# Patient Record
Sex: FEMALE | Race: WHITE | Employment: FULL TIME | URBAN - METROPOLITAN AREA
[De-identification: names, ages, dates, MRNs, and addresses within clinical notes are randomized per-mention and may not be internally consistent; named-entity substitution may affect disease eponyms.]

---

## 2021-04-14 ENCOUNTER — OFFICE VISIT (OUTPATIENT)
Dept: VASCULAR SURGERY | Facility: CLINIC | Age: 56
End: 2021-04-14
Payer: COMMERCIAL

## 2021-04-14 DIAGNOSIS — I78.1 TELANGIECTASIAS: Primary | ICD-10-CM

## 2021-04-14 PROCEDURE — 99202 OFFICE O/P NEW SF 15 MIN: CPT | Performed by: SURGERY

## 2021-04-14 RX ORDER — MULTIPLE VITAMINS W/ MINERALS TAB 9MG-400MCG
1 TAB ORAL DAILY
COMMUNITY

## 2021-04-14 RX ORDER — COVID-19 ANTIGEN TEST
220 KIT MISCELLANEOUS 2 TIMES DAILY WITH MEALS
COMMUNITY

## 2021-04-14 RX ORDER — CALCIUM CARBONATE 500(1250)
1 TABLET ORAL 2 TIMES DAILY
COMMUNITY

## 2021-04-14 NOTE — PROGRESS NOTES
After Visit Follow Up  Per Dr. Keen, patient was recommended to have 3 - 4 sessions at $407 of cosmetic sclerotherapy.    Patient in agreement with plan and had no further questions.    Clara Figueroa on 4/14/2021 at 4:27 PM

## 2021-04-14 NOTE — LETTER
2021         RE: Natali Franklin  [address]  [facility] [address]        Dear Colleague,    Thank you for referring your patient, Natali Franklin, to the [facility] [place]. Please see a copy of my visit note below.        [facility] CONSULTATION    HPI:    Natali Franklin is a 56 year old female who presents with complaints of bilateral leg telangiectasias. They are asymptomatic. She has noticed them from the time of her pregnancies, or at least 30 years. She has not noticed itching of her skin. She has occasional restless-leg type sensations. She has not noticed swelling in her ankles, soreness or cramping in the legs. No history of blood clots or prolonged wound healing. No bleeding from the telangiectasias.     I have reviewed and updated the history.   PAST MEDICAL HISTORY: No past medical history on file.   Prior gastric bypass (SHANNAN resolved following bypass)  GERD  Headaches  Reynaud's syndrome  DL--not on medications  Iron-deficiency anemia     Current Outpatient Medications   Medication Sig Dispense Refill     calcium carbonate (OS-SLOANE) 500 MG tablet Take 1 tablet by mouth 2 times daily       cholecalciferol (VITAMIN D3) 25 mcg (1000 units) capsule Take 1 capsule by mouth daily       multivitamin w/minerals (MULTI-VITAMIN) tablet Take 1 tablet by mouth daily       naproxen sodium 220 MG capsule Take 220 mg by mouth 2 times daily (with meals)         PAST SURGICAL HISTORY: No past surgical history on file.   Laparoscopic pablo-en-Y gastric bypass in 2013  R knee arthroscopy with lateral meniscectomy   x 3    ALLERGIES:    Allergies   Allergen Reactions     Nsaids Other (See Comments)     Patient had gastric bypass surgery.  Should not take oral NSAID's ever.       FAMILY HISTORY: No family history on file.  Mother has varicose veins in the family. No known family history of hypercoagulability or hemophilias.     SOCIAL HISTORY:   Social History     Tobacco  Use     Smoking status: Never Smoker     Smokeless tobacco: Never Used   Substance Use Topics     Alcohol use: Not on file     Non-smoker. Drinks 2 cocktails a week. No illicit drug use. 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     REVIEW OF SYSTEMS:  10-pt ROS negative except as noted in HPI.       Vital signs:  There were no vitals taken for this visit.    PHYSICAL EXAM:  General: Sitting comfortably in chair, NAD.   HEENT: EOMI and conjugate, MMM   Respiratory: Normal respiratory effort.   Cardiovascular: Pulse is regular.   Musculoskeletal: Normal walking gait around exam room. Grossly normal and symmetric strength and ROM in BLE. No edema present.  Vascular: dorsalis pedis: palpable bilaterally.  Cap refill < 3 sec over feet/toes.  Veins: No varicosities present. Scattered bilateral upper and lower leg telangiectasias present.  Neurologic: A&Ox4  Psychiatric: Pleasantly conversant                   ASSESSMENT / PLAN:  Natali Franklin is a 56 year old female who presents with complaints of cosmetically displeasing bilateral leg telangiectasias. She does not have any signs or symptoms concerning for other underlying venous disease.        I discussed that the treatment of telangiectasias is primarily cosmetic and is not covered by insurance.     Discussed that they do not represent a concern to her overall health or wellbeing, and are generally not a symptom of underlying pathology.     Explained that the treatment of telangiectasias is via sclerotherapy, or injections of a medication meant to irritate and scar the veins.     I explained the potential risks of matting, hyperpigmentation, superficial skin ulceration, irritation or pain at the injection sites, future recurrence; discussed that typically 3-4 sessions are required, with about 4 to 6 weeks in between each to determine the degree of response and resolution; the appearance is worse the first 24-72 hours following an injection; that  some scar tissue will always be visible, and that we are trying to diminish the appearance rather than eradicating the veins completely    She would like to focus on her lower legs, as these are the sites that are more prominent with her usual wardrobe. Based on the response there, she may proceed to treating the upper legs as well.     Explained that she is welcome to make an appointment for sclerotherapy at any point in the future, and that this procedure would be done in the office.    Vilma Keen MD      After Visit Follow Up  Per Dr. Keen, patient was recommended to have 3 - 4 sessions at $407 of cosmetic sclerotherapy.    Patient in agreement with plan and had no further questions.    Clara Blair Henry on 4/14/2021 at 4:27 PM      Again, thank you for allowing me to participate in the care of your patient.        Sincerely,        Vilma Keen MD

## 2021-04-14 NOTE — PROGRESS NOTES
[facility] CONSULTATION    HPI:    Natali Franklin is a 56 year old female who presents with complaints of bilateral leg telangiectasias. They are asymptomatic. She has noticed them from the time of her pregnancies, or at least 30 years. She has not noticed itching of her skin. She has occasional restless-leg type sensations. She has not noticed swelling in her ankles, soreness or cramping in the legs. No history of blood clots or prolonged wound healing. No bleeding from the telangiectasias.     I have reviewed and updated the history.   PAST MEDICAL HISTORY: No past medical history on file.   Prior gastric bypass (SHANNAN resolved following bypass)  GERD  Headaches  Reynaud's syndrome  DL--not on medications  Iron-deficiency anemia     Current Outpatient Medications   Medication Sig Dispense Refill     calcium carbonate (OS-SLOANE) 500 MG tablet Take 1 tablet by mouth 2 times daily       cholecalciferol (VITAMIN D3) 25 mcg (1000 units) capsule Take 1 capsule by mouth daily       multivitamin w/minerals (MULTI-VITAMIN) tablet Take 1 tablet by mouth daily       naproxen sodium 220 MG capsule Take 220 mg by mouth 2 times daily (with meals)         PAST SURGICAL HISTORY: No past surgical history on file.   Laparoscopic pablo-en-Y gastric bypass in 2013  R knee arthroscopy with lateral meniscectomy   x 3    ALLERGIES:    Allergies   Allergen Reactions     Nsaids Other (See Comments)     Patient had gastric bypass surgery.  Should not take oral NSAID's ever.       FAMILY HISTORY: No family history on file.  Mother has varicose veins in the family. No known family history of hypercoagulability or hemophilias.     SOCIAL HISTORY:   Social History     Tobacco Use     Smoking status: Never Smoker     Smokeless tobacco: Never Used   Substance Use Topics     Alcohol use: Not on file     Non-smoker. Drinks 2 cocktails a week. No illicit drug use. 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     REVIEW OF  SYSTEMS:  10-pt ROS negative except as noted in HPI.       Vital signs:  There were no vitals taken for this visit.    PHYSICAL EXAM:  General: Sitting comfortably in chair, NAD.   HEENT: EOMI and conjugate, MMM   Respiratory: Normal respiratory effort.   Cardiovascular: Pulse is regular.   Musculoskeletal: Normal walking gait around exam room. Grossly normal and symmetric strength and ROM in BLE. No edema present.  Vascular: dorsalis pedis: palpable bilaterally.  Cap refill < 3 sec over feet/toes.  Veins: No varicosities present. Scattered bilateral upper and lower leg telangiectasias present.  Neurologic: A&Ox4  Psychiatric: Pleasantly conversant                   ASSESSMENT / PLAN:  Natali Franklin is a 56 year old female who presents with complaints of cosmetically displeasing bilateral leg telangiectasias. She does not have any signs or symptoms concerning for other underlying venous disease.        I discussed that the treatment of telangiectasias is primarily cosmetic and is not covered by insurance.     Discussed that they do not represent a concern to her overall health or wellbeing, and are generally not a symptom of underlying pathology.     Explained that the treatment of telangiectasias is via sclerotherapy, or injections of a medication meant to irritate and scar the veins.     I explained the potential risks of matting, hyperpigmentation, superficial skin ulceration, irritation or pain at the injection sites, future recurrence; discussed that typically 3-4 sessions are required, with about 4 to 6 weeks in between each to determine the degree of response and resolution; the appearance is worse the first 24-72 hours following an injection; that some scar tissue will always be visible, and that we are trying to diminish the appearance rather than eradicating the veins completely    She would like to focus on her lower legs, as these are the sites that are more prominent with her usual wardrobe. Based  on the response there, she may proceed to treating the upper legs as well.     Explained that she is welcome to make an appointment for sclerotherapy at any point in the future, and that this procedure would be done in the office.    Vilma Keen MD

## 2021-05-12 ENCOUNTER — OFFICE VISIT (OUTPATIENT)
Dept: VASCULAR SURGERY | Facility: CLINIC | Age: 56
End: 2021-05-12

## 2021-05-12 DIAGNOSIS — I78.1 TELANGIECTASIAS: Primary | ICD-10-CM

## 2021-05-12 PROCEDURE — 36468 NJX SCLRSNT SPIDER VEINS: CPT | Performed by: SURGERY

## 2021-05-12 PROCEDURE — S9999 SALES TAX: HCPCS | Performed by: SURGERY

## 2021-05-12 NOTE — PROGRESS NOTES
Vein Clinic Sclerotherapy Note  May 12, 2021    Pre procedure Diagnosis:    Cosmetically displeasing telangiectasias of the legs      Postoperative Diagnosis:  Same     Procedure:   Sclerotherapy of leg telangiectasias     Surgeon:   Vilma Keen MD    Indication:  Ms. Franklin is a 56F who was evaluated in Vein Clinic. She has telangiectasias of the bilateral lower legs and desired sclerotherapy for management. Details of the procedure including risks of bleeding, infection, ulceration, hyperpigmentation, deep vein thrombosis, and matting were discussed.  The patient understood and wished to proceed.  Informed consent was obtained.      Operative description:  Ms. Franklin was positioned alternately supine and then prone on the procedure table.  Using lighted magnification, the spider veins were identified on the lateral right thigh, anterior and medial right calf, lateral left thigh, and posterior legs.  Each syringe of 0.5% Polidocanol was foamed with 2 parts air and 1 part solution.  The skin overlying the telangiectasias and veins was swabbed with alcohol. A 30-gauge needle was used to access the veins of the leg and these were serially injected with the foamed 0.5% Polidocanol, until filling and blanching of the venous network was achieved. A total of 2 vials of Polidocanol were used.     Hemostasis was obtained with dry gauze and pressure. The legs were then cleaned with saline solution and dressed appropriately with compression stockings.        The patient was asked to walk about 10-15 mins prior to leaving the clinic to return home.  She will return to clinic in 4-6 weeks for a post-operative check.        Vilma Keen MD  Vascular Surgery        [facility] Procedure Note    Natali Franklin  May 12, 2021    Natali Franklin is a 56 year old year old female. She presents for Sclerotherapy  .    There were no vitals taken for this visit.    Flowsheet Data 4/14/2021   Side: Bilateral        Procedures    Vilma Keen MD

## 2021-05-12 NOTE — LETTER
5/12/2021         RE: Natali Franklin  [address]  [facility] [address]        Dear Colleague,    Thank you for referring your patient, Natali Franklin, to the [facility]IN [facility] [place]. Please see a copy of my visit note below.        Vein Clinic Sclerotherapy Note  May 12, 2021    Pre procedure Diagnosis:    Cosmetically displeasing telangiectasias of the legs      Postoperative Diagnosis:  Same     Procedure:   Sclerotherapy of leg telangiectasias     Surgeon:   Vilma Keen MD    Indication:  Ms. Franklin is a 56F who was evaluated in Vein Clinic. She has telangiectasias of the bilateral lower legs and desired sclerotherapy for management. Details of the procedure including risks of bleeding, infection, ulceration, hyperpigmentation, deep vein thrombosis, and matting were discussed.  The patient understood and wished to proceed.  Informed consent was obtained.      Operative description:  Ms. Franklin was positioned alternately supine and then prone on the procedure table.  Using lighted magnification, the spider veins were identified on the lateral right thigh, anterior and medial right calf, lateral left thigh, and posterior legs.  Each syringe of 0.5% Polidocanol was foamed with 2 parts air and 1 part solution.  The skin overlying the telangiectasias and veins was swabbed with alcohol. A 30-gauge needle was used to access the veins of the leg and these were serially injected with the foamed 0.5% Polidocanol, until filling and blanching of the venous network was achieved. A total of 2 vials of Polidocanol were used.     Hemostasis was obtained with dry gauze and pressure. The legs were then cleaned with saline solution and dressed appropriately with compression stockings.        The patient was asked to walk about 10-15 mins prior to leaving the clinic to return home.  She will return to clinic in 4-6 weeks for a post-operative check.        Vilma Keen MD  Vascular  Surgery        [facility] Procedure Note    Natali Franklin  May 12, 2021    Natali Franklin is a 56 year old year old female. She presents for Sclerotherapy  .    There were no vitals taken for this visit.    Flowsheet Data 4/14/2021   Side: Bilateral       Procedures    Vilma Keen MD      Again, thank you for allowing me to participate in the care of your patient.        Sincerely,        Vilma Keen MD

## 2021-08-04 ENCOUNTER — OFFICE VISIT (OUTPATIENT)
Dept: VASCULAR SURGERY | Facility: CLINIC | Age: 56
End: 2021-08-04

## 2021-08-04 DIAGNOSIS — I78.1 TELANGIECTASIAS: Primary | ICD-10-CM

## 2021-08-04 PROCEDURE — 36468 NJX SCLRSNT SPIDER VEINS: CPT | Performed by: SURGERY

## 2021-08-04 PROCEDURE — S9999 SALES TAX: HCPCS | Performed by: SURGERY

## 2021-08-04 NOTE — PROGRESS NOTES
Vein Clinic Sclerotherapy Note  August 4, 2021    Pre procedure Diagnosis:    Cosmetically displeasing telangiectasias of the legs     Postoperative Diagnosis:  Same     Procedure:   Sclerotherapy of leg telangiectasias     Surgeon:   Vilma Keen MD    Indication:  Ms. Franklin is a 56F who was evaluated in Vein Clinic. She has telangiectasias of the bilateral legs and desired sclerotherapy for management. She has previously undergone one treatment of sclerotherapy (on 5/12/21) and returns for repeat therapy; she wished to focus on primarily the lower legs today, as this is where she feels the skin is most visible. Details of the procedure including risks of bleeding, infection, ulceration, hyperpigmentation, deep vein thrombosis, and matting were discussed.  The patient understood and wished to proceed.  Informed consent was obtained.      Operative description:  Ms. Franklin was positioned alternately supine and then prone on the procedure table.  Using lighted magnification, the spider veins were identified on the right medial and lateral calf, left lateral calf, left medial ankle, and posteromedial calves bilaterally.  Each syringe of 0.5% Polidocanol was foamed with 2 parts air and 1 part solution.  The skin overlying the telangiectasias and veins was swabbed with alcohol. A 30-gauge needle was used to access the veins of the leg and these were serially injected with the foamed 0.5% Polidocanol, until filling and blanching of the venous network was achieved. A total of 1 vial of Polidocanol was used.     Hemostasis was obtained with dry gauze and pressure. The legs were then cleaned with saline solution and dressed appropriately with compression stockings.        The patient was asked to walk about 10-15 mins prior to leaving the clinic to return home.  She will return to clinic in 4-6 weeks for a post-operative check.      Procedures    Vilma Keen MD  Vascular Surgery

## 2021-08-04 NOTE — PROGRESS NOTES
[facility] Procedure Note    Natali Franklin  August 4, 2021    Natali Franklin is a 56 year old year old female. She presents for Sclerotherapy  .    There were no vitals taken for this visit.    Flowsheet Data 4/14/2021   Side: Bilateral       Sclerotherapy    Date/Time: 8/4/2021 5:21 PM  Performed by: Vilma Keen MD  Authorized by: Vilma Keen MD     Time out: Immediately prior to the procedure a time out was called    Preparation: Patient was prepped and draped in usual sterile fashion    Type:  Cosmetic  Session:  Limited  Procedure side:  Bilateral  Solution/Amount:  .5 POLIDOCANOL  Syringes::  2  Patient tolerance:  Patient tolerated the procedure well with no immediate complications  Wrap/Hose:  Wraps and Hose        Vilma Keen MD

## 2021-08-04 NOTE — LETTER
8/4/2021         RE: Natali Franklin  [address]  [facility] [address]        Dear Colleague,    Thank you for referring your patient, Natali Franklin, to the [facility] VEIN CL[facility] [place]. Please see a copy of my visit note below.        Vein Clinic Sclerotherapy Note  August 4, 2021    Pre procedure Diagnosis:    Cosmetically displeasing telangiectasias of the legs     Postoperative Diagnosis:  Same     Procedure:   Sclerotherapy of leg telangiectasias     Surgeon:   Vilma Keen MD    Indication:  Ms. Franklin is a 56F who was evaluated in Vein Clinic. She has telangiectasias of the bilateral legs and desired sclerotherapy for management. She has previously undergone one treatment of sclerotherapy (on 5/12/21) and returns for repeat therapy; she wished to focus on primarily the lower legs today, as this is where she feels the skin is most visible. Details of the procedure including risks of bleeding, infection, ulceration, hyperpigmentation, deep vein thrombosis, and matting were discussed.  The patient understood and wished to proceed.  Informed consent was obtained.      Operative description:  Ms. Franklin was positioned alternately supine and then prone on the procedure table.  Using lighted magnification, the spider veins were identified on the right medial and lateral calf, left lateral calf, left medial ankle, and posteromedial calves bilaterally.  Each syringe of 0.5% Polidocanol was foamed with 2 parts air and 1 part solution.  The skin overlying the telangiectasias and veins was swabbed with alcohol. A 30-gauge needle was used to access the veins of the leg and these were serially injected with the foamed 0.5% Polidocanol, until filling and blanching of the venous network was achieved. A total of 1 vial of Polidocanol was used.     Hemostasis was obtained with dry gauze and pressure. The legs were then cleaned with saline solution and dressed appropriately with compression  stockings.        The patient was asked to walk about 10-15 mins prior to leaving the clinic to return home.  She will return to clinic in 4-6 weeks for a post-operative check.      Procedures    Vilma Keen MD  Vascular Surgery          [facility] Procedure Note    Natali Franklin  August 4, 2021    Natali Franklin is a 56 year old year old female. She presents for Sclerotherapy  .    There were no vitals taken for this visit.    Flowsheet Data 4/14/2021   Side: Bilateral       Sclerotherapy    Date/Time: 8/4/2021 5:21 PM  Performed by: Vilma Keen MD  Authorized by: Vilma Keen MD     Time out: Immediately prior to the procedure a time out was called    Preparation: Patient was prepped and draped in usual sterile fashion    Type:  Cosmetic  Session:  Limited  Procedure side:  Bilateral  Solution/Amount:  .5 POLIDOCANOL  Syringes::  2  Patient tolerance:  Patient tolerated the procedure well with no immediate complications  Wrap/Hose:  Wraps and Hose        Vilma Keen MD      Again, thank you for allowing me to participate in the care of your patient.        Sincerely,        Vilma Keen MD